# Patient Record
Sex: MALE | NOT HISPANIC OR LATINO | Employment: FULL TIME | URBAN - METROPOLITAN AREA
[De-identification: names, ages, dates, MRNs, and addresses within clinical notes are randomized per-mention and may not be internally consistent; named-entity substitution may affect disease eponyms.]

---

## 2019-08-21 ENCOUNTER — THERAPY VISIT (OUTPATIENT)
Dept: PHYSICAL THERAPY | Facility: CLINIC | Age: 39
End: 2019-08-21
Payer: COMMERCIAL

## 2019-08-21 ENCOUNTER — MEDICAL CORRESPONDENCE (OUTPATIENT)
Dept: PHYSICAL THERAPY | Facility: CLINIC | Age: 39
End: 2019-08-21

## 2019-08-21 DIAGNOSIS — G89.29 CHRONIC PAIN OF BOTH SHOULDERS: ICD-10-CM

## 2019-08-21 DIAGNOSIS — M25.511 CHRONIC PAIN OF BOTH SHOULDERS: ICD-10-CM

## 2019-08-21 DIAGNOSIS — M25.512 CHRONIC PAIN OF BOTH SHOULDERS: ICD-10-CM

## 2019-08-21 PROCEDURE — 97110 THERAPEUTIC EXERCISES: CPT | Mod: GP | Performed by: PHYSICAL THERAPIST

## 2019-08-21 PROCEDURE — 97161 PT EVAL LOW COMPLEX 20 MIN: CPT | Mod: GP | Performed by: PHYSICAL THERAPIST

## 2019-08-21 PROCEDURE — 97112 NEUROMUSCULAR REEDUCATION: CPT | Mod: GP | Performed by: PHYSICAL THERAPIST

## 2019-08-21 NOTE — PROGRESS NOTES
[facility] Initial Evaluation  Subjective:  The history is provided by the patient.   Everett Rice being seen for both shoulders.   Problem began 6/28/2019 (MD). Where condition occurred: during recreation / sport.Problem occurred: push ups  and reported as 6/10 on pain scale. General health as reported by patient is good. Pertinent medical history includes:  None.   Other medical allergies details: none.  Surgeries include:  None.  Current medications:  Pain medication. Other medications details: omeprazole.   Primary job tasks include:  Prolonged standing, operating a machine/assembly, repetitive tasks and pushing/pulling.  Pain is described as sharp and stabbing and is intermittent. Pain is the same all the time. Since onset symptoms are gradually worsening.      Patient is . Restrictions include:  Working in normal job without restrictions.    Barriers include:  None as reported by patient.  Red flags:  None as reported by patient.  Type of problem:  Bilateral shoulders   Condition occurred with:  Repetition/overuse. This is a chronic condition   Problem details: Pt notes that after doing some push ups about 4 months ago he felt pain in both his shoulders. He thought it would go away with rest, but it did not. He did consult with his PCP for this on 6/28/19 and he was referred to PT..   Patient reports pain:  Posterior and upper arm (same on both).  Associated symptoms:  Loss of strength. Symptoms are exacerbated by lying on extremity, using arm at shoulder level, certain positions and using arm overhead and relieved by rest.                      Objective:  Standing Alignment:    Cervical/Thoracic:  Forward head (minimal to moderate)  Shoulder/UE:  Rounded shoulders (minimal)                                       Shoulder Evaluation:  ROM:  AROM:  normal (bilaterally)  Flexion:  Left:  WNL    Right:  WNL    Abduction:  Left: WNL   Right:  WNL      External Rotation:   Left:  WNL    Right:  WNL            Extension/Internal Rotation:  Left:  WNL (thumb to T8)    Right:  WNL (1 inch below that of L)      Pain: at end ranges for flex, abd and ext/IR on R only    Strength:    Flexion: Left:5/5    Pain: -    Right: 5/5      Pain:  -    Abduction:  Left: 5/5   Pain:-    Right: 4/5      Pain:+    Internal Rotation:  Left:5/5      Pain:-    Right: 4+/5      Pain:-/+  External Rotation:   Left:4+/5      Pain:-   Right:4+/5      Pain:-/+              Special Tests:    Left shoulder positive for the following special tests:  Impingement  Left shoulder negative for the following special tests:  Bursal; Rotator cuff tear and Acromioclavicular  Right shoulder positive for the following special tests:Impingement  Right shoulder negative for the following special tests:Bursal; Rotator cuff tear and Acrimioclavicular  Palpation:    Left shoulder tenderness present at:  Infraspinatus  Left shoulder tenderness not present at: Biceps; Supraspinatus or Deltoid  Right shoulder tenderness present at: Supraspinatus and Infraspinatus  Right shoulder tenderness not present at:Acrimioclavicular; Biceps or Deltoid                                     General     ROS    Assessment/Plan:    Patient is a 39 year old male with both sides shoulder complaints.    Patient has the following significant findings with corresponding treatment plan.                Diagnosis 1:  B shoulder impingement, strain, R>L  Pain -  hot/cold therapy, self management, education and home program  Decreased strength - therapeutic exercise and therapeutic activities  Impaired muscle performance - neuro re-education  Decreased function - therapeutic activities  Impaired posture - neuro re-education and therapeutic activities    Therapy Evaluation Codes:   1) History comprised of:   Personal factors that impact the plan of care:      Time since onset of symptoms.    Comorbidity factors that impact the plan of care are:      essential  tremor.     Medications impacting care: Pain.  2) Examination of Body Systems comprised of:   Body structures and functions that impact the plan of care:      Shoulder.   Activity limitations that impact the plan of care are:      Sleeping, Laying down and reaching.  3) Clinical presentation characteristics are:   Stable/Uncomplicated.  4) Decision-Making    Low complexity using standardized patient assessment instrument and/or measureable assessment of functional outcome.  Cumulative Therapy Evaluation is: Low complexity.    Previous and current functional limitations:  (See Goal Flow Sheet for this information)    Short term and Long term goals: (See Goal Flow Sheet for this information)     Communication ability:  Patient appears to be able to clearly communicate and understand verbal and written communication and follow directions correctly.  Treatment Explanation - The following has been discussed with the patient:   RX ordered/plan of care  Anticipated outcomes  Possible risks and side effects  This patient would benefit from PT intervention to resume normal activities.   Rehab potential is excellent.    Frequency:  1 X week, once daily  Duration:  for 6 weeks  Discharge Plan:  Achieve all LTG.  Independent in home treatment program.  Reach maximal therapeutic benefit.    Please refer to the daily flowsheet for treatment today, total treatment time and time spent performing 1:1 timed codes.

## 2019-11-27 PROBLEM — M25.511 CHRONIC PAIN OF BOTH SHOULDERS: Status: RESOLVED | Noted: 2019-08-21 | Resolved: 2019-11-27

## 2019-11-27 PROBLEM — M25.512 CHRONIC PAIN OF BOTH SHOULDERS: Status: RESOLVED | Noted: 2019-08-21 | Resolved: 2019-11-27

## 2019-11-27 PROBLEM — G89.29 CHRONIC PAIN OF BOTH SHOULDERS: Status: RESOLVED | Noted: 2019-08-21 | Resolved: 2019-11-27

## 2021-05-30 ENCOUNTER — HEALTH MAINTENANCE LETTER (OUTPATIENT)
Age: 41
End: 2021-05-30

## 2021-09-19 ENCOUNTER — HEALTH MAINTENANCE LETTER (OUTPATIENT)
Age: 41
End: 2021-09-19

## 2022-06-26 ENCOUNTER — HEALTH MAINTENANCE LETTER (OUTPATIENT)
Age: 42
End: 2022-06-26

## 2022-11-21 ENCOUNTER — HEALTH MAINTENANCE LETTER (OUTPATIENT)
Age: 42
End: 2022-11-21

## 2023-07-09 ENCOUNTER — OFFICE VISIT (OUTPATIENT)
Dept: URGENT CARE | Facility: URGENT CARE | Age: 43
End: 2023-07-09
Payer: COMMERCIAL

## 2023-07-09 ENCOUNTER — HEALTH MAINTENANCE LETTER (OUTPATIENT)
Age: 43
End: 2023-07-09

## 2023-07-09 VITALS
OXYGEN SATURATION: 98 % | DIASTOLIC BLOOD PRESSURE: 73 MMHG | TEMPERATURE: 97.8 F | RESPIRATION RATE: 16 BRPM | HEART RATE: 65 BPM | SYSTOLIC BLOOD PRESSURE: 124 MMHG | WEIGHT: 205.3 LBS

## 2023-07-09 DIAGNOSIS — L03.119 CELLULITIS AND ABSCESS OF UPPER EXTREMITY: Primary | ICD-10-CM

## 2023-07-09 DIAGNOSIS — L02.419 CELLULITIS AND ABSCESS OF UPPER EXTREMITY: Primary | ICD-10-CM

## 2023-07-09 PROCEDURE — 99203 OFFICE O/P NEW LOW 30 MIN: CPT | Performed by: NURSE PRACTITIONER

## 2023-07-09 RX ORDER — OMEPRAZOLE 40 MG/1
CAPSULE, DELAYED RELEASE ORAL
COMMUNITY
Start: 2023-06-08

## 2023-07-09 RX ORDER — SULFAMETHOXAZOLE/TRIMETHOPRIM 800-160 MG
1 TABLET ORAL 2 TIMES DAILY
Qty: 14 TABLET | Refills: 0 | Status: SHIPPED | OUTPATIENT
Start: 2023-07-09 | End: 2023-07-16

## 2023-07-09 NOTE — PROGRESS NOTES
Assessment & Plan     Cellulitis and abscess of upper extremity    - sulfamethoxazole-trimethoprim (BACTRIM DS) 800-160 MG tablet  Dispense: 14 tablet; Refill: 0     Prescription sent to pharmacy for Bactrim twice daily for 7 days for abscesses with cellulitis. Apply warm compresses. Keep skin clean and dry. Redness is outlined. Watch closely for worsening symptoms of infection including fever, chills, redness, swelling, pain, purulent discharge and follow-up right away if develops. Avoid picking or popping.       Follow-up with PCP if symptoms persist for 4 days, and sooner if symptoms worsen or new symptoms develop.     Discussed red flag symptoms which warrant immediate visit in emergency room    All questions were answered and patient verbalized understanding. AVS reviewed with patient.     Brittanie Gambino, DNP, APRN, CNP 7/9/2023 4:21 PM  [facility] Floyd is a 43 year old male who presents to clinic today for the following health issues:  Chief Complaint   Patient presents with     Derm Problem     PT c/o 2 bumps on left arm noticed it 2 days ago, itch little, painful to the touch. Pt wife put some tree tea oil on area did not help much     Rash    Onset of rash was 2 day(s) ago.   Course of illness is worsening.  Severity moderate  Current and Associated symptoms: itching, painful and red- had some drainage overnight  Location of the rash: left arm.  Previous history of a similar rash? No  Recent exposure history: none known  Associated symptoms include: none  Denies  sore throat, tongue/lip swelling and fever.  Treatment measures tried include: Tea Tree oil didn't seem to help much  No history of MRSA      Problem list, Medication list, Allergies, and Medical history reviewed in EPIC.    ROS:  Review of systems negative except for noted above        Objective    /73 (BP Location: Left arm, Patient Position: Sitting, Cuff Size: Adult Regular)    Pulse 65   Temp 97.8  F (36.6  C) (Tympanic)   Resp 16   Wt 93.1 kg (205 lb 4.8 oz)   SpO2 98%   Physical Exam  Constitutional:       General: He is not in acute distress.     Appearance: He is not toxic-appearing or diaphoretic.   Musculoskeletal:      Comments: Mild edema left upper arm with tenderness with palpation   Lymphadenopathy:      Cervical: No cervical adenopathy.   Skin:     General: Skin is warm and dry.      Findings: Erythema present.      Comments: Two 1 cm firm abscesses left upper arm with approx 5 x 3 cm surrounding erythema with dried draiange   Neurological:      Mental Status: He is alert.            Verbal consent obtained from patient to take photo for medical electronic health record

## 2024-08-31 ENCOUNTER — HEALTH MAINTENANCE LETTER (OUTPATIENT)
Age: 44
End: 2024-08-31